# Patient Record
Sex: MALE | Race: WHITE | Employment: FULL TIME | URBAN - METROPOLITAN AREA
[De-identification: names, ages, dates, MRNs, and addresses within clinical notes are randomized per-mention and may not be internally consistent; named-entity substitution may affect disease eponyms.]

---

## 2020-07-05 ENCOUNTER — HOSPITAL ENCOUNTER (EMERGENCY)
Facility: CLINIC | Age: 60
Discharge: HOME | End: 2020-07-05
Attending: EMERGENCY MEDICINE
Payer: COMMERCIAL

## 2020-07-05 VITALS
OXYGEN SATURATION: 94 % | SYSTOLIC BLOOD PRESSURE: 134 MMHG | WEIGHT: 225 LBS | TEMPERATURE: 98.3 F | BODY MASS INDEX: 32.21 KG/M2 | DIASTOLIC BLOOD PRESSURE: 86 MMHG | HEART RATE: 93 BPM | HEIGHT: 70 IN | RESPIRATION RATE: 16 BRPM

## 2020-07-05 PROCEDURE — 2580000003 HC RX 258: Performed by: EMERGENCY MEDICINE

## 2020-07-05 PROCEDURE — 96375 TX/PRO/DX INJ NEW DRUG ADDON: CPT

## 2020-07-05 PROCEDURE — 96374 THER/PROPH/DIAG INJ IV PUSH: CPT

## 2020-07-05 PROCEDURE — 99282 EMERGENCY DEPT VISIT SF MDM: CPT

## 2020-07-05 PROCEDURE — 2500000003 HC RX 250 WO HCPCS: Performed by: EMERGENCY MEDICINE

## 2020-07-05 PROCEDURE — 6360000002 HC RX W HCPCS: Performed by: EMERGENCY MEDICINE

## 2020-07-05 RX ORDER — PREDNISONE 50 MG/1
50 TABLET ORAL DAILY
Qty: 4 TABLET | Refills: 0 | Status: SHIPPED | OUTPATIENT
Start: 2020-07-05 | End: 2020-07-09

## 2020-07-05 RX ORDER — SODIUM CHLORIDE 9 MG/ML
INJECTION, SOLUTION INTRAVENOUS CONTINUOUS
Status: DISCONTINUED | OUTPATIENT
Start: 2020-07-05 | End: 2020-07-05 | Stop reason: HOSPADM

## 2020-07-05 RX ORDER — DIPHENHYDRAMINE HYDROCHLORIDE 50 MG/ML
25 INJECTION INTRAMUSCULAR; INTRAVENOUS ONCE
Status: COMPLETED | OUTPATIENT
Start: 2020-07-05 | End: 2020-07-05

## 2020-07-05 RX ORDER — EPINEPHRINE 0.3 MG/.3ML
0.3 INJECTION SUBCUTANEOUS ONCE
Qty: 0.3 ML | Refills: 0 | Status: SHIPPED | OUTPATIENT
Start: 2020-07-05 | End: 2020-07-05

## 2020-07-05 RX ORDER — FAMOTIDINE 20 MG/1
20 TABLET, FILM COATED ORAL 2 TIMES DAILY
Qty: 10 TABLET | Refills: 0 | Status: SHIPPED | OUTPATIENT
Start: 2020-07-05

## 2020-07-05 RX ADMIN — DIPHENHYDRAMINE HYDROCHLORIDE 25 MG: 50 INJECTION, SOLUTION INTRAMUSCULAR; INTRAVENOUS at 13:20

## 2020-07-05 RX ADMIN — SODIUM CHLORIDE: 9 INJECTION, SOLUTION INTRAVENOUS at 13:20

## 2020-07-05 RX ADMIN — FAMOTIDINE 20 MG: 10 INJECTION, SOLUTION INTRAVENOUS at 13:30

## 2020-07-05 NOTE — ED PROVIDER NOTES
[facility]  [facility]  Phone: [address]      Pt Name: Maria Luisa Carrion  MRN: [id number]  [place] 1960  Date of evaluation: 7/5/2020    CHIEF COMPLAINT       Chief Complaint   Patient presents with    Allergic Reaction    Respiratory Distress       HISTORY OF PRESENT ILLNESS    Maria Luisa Carrion is a 63 y.o. male who presents to the emergency department by ambulance for anaphylactic reaction to bee sting. Got stung on his left hand and his right finger. Describe difficulty breathing called an ambulance. He was given subcutaneous epinephrine as well as IV Benadryl and Solu-Medrol. His breathing has improved denies difficulty swallowing or chest pain. Denies any other complaints. No history of reactions in the past.    REVIEW OF SYSTEMS       Constitutional: No fevers or chills   HEENT: No sore throat, rhinorrhea, or earache   Eyes: No blurry vision or double vision no drainage   Cardiovascular: No chest pain or tachycardia   Respiratory: No wheezing positive shortness of breath no cough  Gastrointestinal: No nausea, vomiting, diarrhea, constipation, or abdominal pain   : No hematuria or dysuria   Musculoskeletal: No swelling or pain   Skin: No rash   Neurological: No focal neurologic complaints, paresthesias, weakness, or headache     PAST MEDICAL HISTORY    has a past medical history of Hyperlipidemia and Hypertension. SURGICAL HISTORY      has no past surgical history on file. CURRENT MEDICATIONS       Previous Medications    No medications on file       ALLERGIES     has No Known Allergies. FAMILY HISTORY     has no family status information on file. family history is not on file. SOCIAL HISTORY      reports that he has quit smoking. He has never used smokeless tobacco. He reports current alcohol use. He reports that he does not use drugs.     PHYSICAL EXAM       ED Triage Vitals     98.3 °F (36.8 °C) 125 20 176/90 97 % Constitutional: Alert, oriented x3, nontoxic, answering questions appropriately, acting properly for age, in no acute distress   HEENT: Extraocular muscles intact, mucus membranes moist, no angioedema no uvular edema no posterior pharyngeal erythema or exudates, Pupils equal, round, reactive to light,   Neck: Trachea midline no stridor  Cardiovascular: Regular rhythm and tachycardia no S3, S4, or murmurs   Respiratory: Clear to auscultation bilaterally no wheezes, rhonchi, rales, no respiratory distress no tachypnea no retractions no hypoxia  Gastrointestinal: Soft, nontender, nondistended, positive bowel sounds. No rebound, rigidity, or guarding. Musculoskeletal: No extremity pain or swelling bee sting left dorsal hand and right third finger with localized reaction  Neurologic: Moving all 4 extremities without difficulty there are no gross focal neurologic deficits   Skin: Warm and dry diffuse urticaria        DIFFERENTIAL DIAGNOSIS/ MDM:     Acute anaphylactic reaction to bee sting. Will give more Benadryl IV and continue to monitor. IV fluids and Pepcid IV. DIAGNOSTIC RESULTS     EKG: All EKG's are interpreted by the Emergency Department Physician who either signs or Co-signs this chart in the absence of a cardiologist.        Not indicated unless otherwise documented above    LABS:  No results found for this visit on 07/05/20.     Not indicated unless otherwise documented above    RADIOLOGY:   I reviewed the radiologist interpretations:    No orders to display       Not indicated unless otherwise documented above    EMERGENCY DEPARTMENT COURSE:     The patient was given the following medications:  Orders Placed This Encounter   Medications    0.9 % sodium chloride infusion    diphenhydrAMINE (BENADRYL) injection 25 mg    famotidine (PEPCID) injection 20 mg    predniSONE (DELTASONE) 50 MG tablet     Sig: Take 1 tablet by mouth daily for 4 days     Dispense:  4 tablet     Refill:  0    allergic reaction    FAMOTIDINE (PEPCID) 20 MG TABLET    Take 1 tablet by mouth 2 times daily    PREDNISONE (DELTASONE) 50 MG TABLET    Take 1 tablet by mouth daily for 4 days       (Please note that portions of thisnote were completed with a voice recognition program.  Efforts were made to edit the dictations but occasionally words are mis-transcribed.)    Hayde Wise,,   Attending Emergency Physician        Hayde Wise,   07/05/20 4907

## 2020-07-05 NOTE — ED NOTES
Pt presents to the ED via squad for a c/c of respiratory distress and allergic reaction. Pt states that he was outside digging in his garden when he got stung by some bees and he started to feel dizzy and started to feel funny. Pt states that his mouth felt dry and his tongue felt tingly and like his throat was closing so his wife called 911 for respiratory distress. . Squad gave patient 0.5mg EPI SQ, 25mg benadryl IV, 125mg Solumedrol IV on scene with some relief. Pt has red patchy splotches to his legs and he states his eyes are red and swollen. Pt denies chest pain, SOB, nausea, vomiting, diarrhea, fevers, or chills. Pt resting in bed in NAD, skin warm and dry, respirations even and unlabored and call light within reach.      Marvin Briceno RN  07/05/20 [id number]

## 2020-07-05 NOTE — ED NOTES
Keyla Murphy,  at bedside for evaluation and to update patient on plan of care.       Bandar Beasley RN  07/05/20 [id number]

## 2020-10-27 ENCOUNTER — HOSPITAL ENCOUNTER (EMERGENCY)
Facility: CLINIC | Age: 60
Discharge: HOME | End: 2020-10-27
Attending: EMERGENCY MEDICINE
Payer: COMMERCIAL

## 2020-10-27 ENCOUNTER — APPOINTMENT (OUTPATIENT)
Dept: GENERAL RADIOLOGY | Facility: CLINIC | Age: 60
End: 2020-10-27
Payer: COMMERCIAL

## 2020-10-27 VITALS
TEMPERATURE: 98 F | DIASTOLIC BLOOD PRESSURE: 96 MMHG | HEART RATE: 78 BPM | SYSTOLIC BLOOD PRESSURE: 166 MMHG | RESPIRATION RATE: 18 BRPM | OXYGEN SATURATION: 98 %

## 2020-10-27 PROCEDURE — 73610 X-RAY EXAM OF ANKLE: CPT

## 2020-10-27 PROCEDURE — 99283 EMERGENCY DEPT VISIT LOW MDM: CPT

## 2020-10-27 PROCEDURE — 6370000000 HC RX 637 (ALT 250 FOR IP): Performed by: EMERGENCY MEDICINE

## 2020-10-27 RX ORDER — COLCHICINE 0.6 MG/1
0.6 TABLET ORAL DAILY
COMMUNITY

## 2020-10-27 RX ORDER — IBUPROFEN 600 MG/1
600 TABLET ORAL EVERY 6 HOURS PRN
Qty: 20 TABLET | Refills: 0 | Status: SHIPPED | OUTPATIENT
Start: 2020-10-27

## 2020-10-27 RX ORDER — ROSUVASTATIN CALCIUM 10 MG/1
10 TABLET, COATED ORAL DAILY
Qty: 30 TABLET | Refills: 0 | Status: SHIPPED | OUTPATIENT
Start: 2020-10-27

## 2020-10-27 RX ORDER — ROSUVASTATIN CALCIUM 10 MG/1
10 TABLET, COATED ORAL DAILY
COMMUNITY
End: 2020-10-27 | Stop reason: SDUPTHER

## 2020-10-27 RX ORDER — IBUPROFEN 800 MG/1
800 TABLET ORAL ONCE
Status: COMPLETED | OUTPATIENT
Start: 2020-10-27 | End: 2020-10-27

## 2020-10-27 RX ORDER — VALSARTAN AND HYDROCHLOROTHIAZIDE 80; 12.5 MG/1; MG/1
1 TABLET, FILM COATED ORAL DAILY
Qty: 30 TABLET | Refills: 0 | Status: SHIPPED | OUTPATIENT
Start: 2020-10-27

## 2020-10-27 RX ORDER — ALLOPURINOL 100 MG/1
100 TABLET ORAL DAILY
COMMUNITY

## 2020-10-27 RX ADMIN — IBUPROFEN 800 MG: 800 TABLET, FILM COATED ORAL at 07:25

## 2020-10-27 ASSESSMENT — PAIN SCALES - GENERAL
PAINLEVEL_OUTOF10: 7
PAINLEVEL_OUTOF10: 7

## 2020-10-27 ASSESSMENT — PAIN DESCRIPTION - LOCATION: LOCATION: ANKLE

## 2020-10-27 ASSESSMENT — PAIN DESCRIPTION - PAIN TYPE: TYPE: ACUTE PAIN

## 2020-10-27 ASSESSMENT — PAIN DESCRIPTION - ORIENTATION: ORIENTATION: RIGHT

## 2020-10-27 NOTE — ED PROVIDER NOTES
[facility]  [facility]  Phone: [phone]        Pt Name: Marylen Lowes  MRN: [id number]  [place] 1960  Date of evaluation: 10/27/20      CHIEF COMPLAINT     Chief Complaint   Patient presents with    Ankle Pain     right ankle trip and fall on saturday          HISTORY OF PRESENT ILLNESS  (Location/Symptom, Timing/Onset, Context/Setting, Quality, Duration, Modifying Factors, Severity.)    Marylen Lowes is a 63 y.o. male who presents with right ankle pain. Patient states he sustained a fall in his garage on Saturday. He states he really did not have pain at that time, but started to develop pain on Sunday morning. Today, he attempted to go to work and was unable to walk without pain. He denies sustaining any other injury. Did not strike his head. No loss of conscious. No neck, back, or abdominal pain. No pain in his knee or hip. No fever or chills. Patient states that he has hypertension and hyperlipidemia, and has been noncompliant with his medication since his doctor retired. He is wondering if I could refill some of his medicines. He also has a history of gout, and has not been taking any medications for that.       REVIEW OF SYSTEMS    (2-9 systems for level 4, 10 or more for level 5)     Constitutional: no fever, chills, fatigue  HENT: No headache, nasal congestion, sore throat, hearing changes, ear pain or discharge  Eyes: no visual changes or photophobia  Respiratory: no cough, shortness of breath, or wheezing  Cardiovascular: no chest pain, palpitations, or leg swelling  Abdominal: no abdominal pain, nausea, vomiting, diarrhea, or constipation  Genitourinary: no dysuria, frequency, or urgency  Musculoskeletal: no arthralgias, myalgias, neck or back pain, +right ankle pain  Skin: no rash or wound  Neurological: no numbness, tingling or weakness  Hematologic:  no history of easy bleeding or bruising            PAST MEDICAL HISTORY    has a past medical history of Hyperlipidemia and Hypertension. SURGICAL HISTORY      has no past surgical history on file. CURRENTMEDICATIONS       Previous Medications    ALLOPURINOL (ZYLOPRIM) 100 MG TABLET    Take 100 mg by mouth daily    COLCHICINE (COLCRYS) 0.6 MG TABLET    Take 0.6 mg by mouth daily    EPINEPHRINE (EPIPEN 2-JABIER) 0.3 MG/0.3ML SOAJ INJECTION    Inject 0.3 mLs into the skin once for 1 dose Use as directed for allergic reaction    FAMOTIDINE (PEPCID) 20 MG TABLET    Take 1 tablet by mouth 2 times daily       ALLERGIES     has No Known Allergies. FAMILY HISTORY     has no family status information on file. family history is not on file. SOCIAL HISTORY      reports that he has quit smoking. He has never used smokeless tobacco. He reports current alcohol use. He reports that he does not use drugs. PHYSICAL EXAM    (up to 7 for level 4, 8 or more for level 5)   INITIAL VITALS:  temperature is 98 °F (36.7 °C). His blood pressure is 166/96 (abnormal) and his pulse is 78. His respiration is 18 and oxygen saturation is 98%. Physical Exam  Vitals signs and nursing note reviewed. Constitutional:       General: He is not in acute distress. Appearance: Normal appearance. He is not ill-appearing, toxic-appearing or diaphoretic. HENT:      Head: Normocephalic and atraumatic. Eyes:      Extraocular Movements: Extraocular movements intact. Pupils: Pupils are equal, round, and reactive to light. Neck:      Musculoskeletal: Normal range of motion. No muscular tenderness. Abdominal:      General: Abdomen is flat. Palpations: Abdomen is soft. Tenderness: There is no abdominal tenderness. Musculoskeletal: Normal range of motion. General: Tenderness present. Comments: No midline tenderness in the thoracic or lumbar spine. No step-offs. Evaluation of the right ankle reveals no bony tenderness over the lateral or medial malleoli.   No tenderness over the calcaneus. No tenderness over the fifth metatarsal.  No proximal fibula tenderness. Patient has discomfort with palpation of the Achilles tendon. His foot plantar flexes with Felix's test.  There is no erythema, swelling, or warmth to the joint. Dorsalis pedis and posterior tibial artery pulses are palpable and symmetrical bilaterally. No tenderness over the foot itself. Skin:     General: Skin is warm and dry. Capillary Refill: Capillary refill takes less than 2 seconds. Neurological:      Mental Status: He is alert and oriented to person, place, and time. Mental status is at baseline. Psychiatric:         Mood and Affect: Mood normal.         Behavior: Behavior normal.         Thought Content: Thought content normal.         DIFFERENTIAL DIAGNOSIS/ MDM:     69-year-old male here with pain over his Achilles tendon after fall in his garage on Saturday morning. No significant bony tenderness appreciated on exam.  He does have tenderness over the Achilles tendon, with an intact Felix's test.  My impression is that this is Achilles tendinitis. I will order an ankle x-ray just to make sure that there are no other abnormalities such as an avulsion. I will treat the patient with anti-inflammatories. I discussed with him that I would refill his antihypertensive and his statin. We will provide him with a list of primary care doctors in the area. Patient assures me he will call one of those physicians, and schedule an appointment.     DIAGNOSTIC RESULTS     EKG: All EKG's are interpreted by the Emergency Department Physician who either signs or Co-signs this chart in the absenceof a cardiologist.    none    RADIOLOGY:  Non-plain film images such as CT, Ultrasound and MRI are read by the radiologist. Plain radiographic images are visualized and the radiologist interpretations are reviewed as follows:         Interpretation per the Radiologist below, if available at the time of this note:    Xr Ankle Right (min 3 Views)    Result Date: 10/27/2020  EXAMINATION: THREE XRAY VIEWS OF THE RIGHT ANKLE 10/27/2020 7:30 am COMPARISON: None. HISTORY: ORDERING SYSTEM PROVIDED HISTORY: right ankle pain TECHNOLOGIST PROVIDED HISTORY: right ankle pain Reason for Exam: Pt c/o right ankle pain s/p trip and fall 4 days ago Acuity: Acute Type of Exam: Initial FINDINGS: The bone mineralization is within normal limits. The ankle mortise is stable. No acute fractures or dislocations are seen. There are old avulsion injuries involving the lateral  and medial malleoli. There is a calcaneal spur at the insertion site of the plantar fascia. There is no soft tissue swelling     1. No acute abnormality involving the right ankle. LABS:  No results found for this visit on 10/27/20.    none    EMERGENCY DEPARTMENT COURSE:   Vitals:    Vitals:    10/27/20 0714 10/27/20 0716 10/27/20 0718   BP:   (!) 166/96   Pulse: 78     Resp: 18     Temp:  98 °F (36.7 °C)    SpO2: 98%       -------------------------  BP: (!) 166/96, Temp: 98 °F (36.7 °C), Pulse: 78, Resp: 18      RE-EVALUATION:  Patient resting comfortably. Updated on results. CONSULTS:  None     PROCEDURES:  None    FINAL IMPRESSION      1.  Achilles tendinitis of right lower extremity          DISPOSITION/PLAN   DISPOSITION Decision To Discharge 10/27/2020 08:11:24 AM      CONDITION ON DISPOSITION:   Stable    PATIENT REFERRED TO:    [id number] for follow up appointment with a primary care doctor in 2-3 days        Ileana Sullivan, [address]  [place]  [phone]    Schedule an appointment as soon as possible for a visit in 3 days        DISCHARGE MEDICATIONS:  New Prescriptions    IBUPROFEN (IBU) 600 MG TABLET    Take 1 tablet by mouth every 6 hours as needed for Pain    VALSARTAN-HYDROCHLOROTHIAZIDE (DIOVAN-HCT) 80-12.5 MG PER TABLET    Take 1 tablet by mouth daily       (Please note that portions of this note were completed with a voicerecognition program.  Efforts were made to edit the dictations but occasionally words are mis-transcribed.)    Sheldon Oseguera MD  Attending Emergency Medicine Physician       Sheldon Oseguera MD  10/27/20 1875

## 2020-12-08 ENCOUNTER — HOSPITAL ENCOUNTER (OUTPATIENT)
Dept: MRI IMAGING | Facility: CLINIC | Age: 60
Discharge: HOME | End: 2020-12-10
Payer: COMMERCIAL

## 2020-12-08 PROCEDURE — 73721 MRI JNT OF LWR EXTRE W/O DYE: CPT

## 2024-03-18 ENCOUNTER — HOSPITAL ENCOUNTER (EMERGENCY)
Age: 64
Discharge: HOME | End: 2024-03-18
Attending: EMERGENCY MEDICINE
Payer: COMMERCIAL

## 2024-03-18 ENCOUNTER — APPOINTMENT (OUTPATIENT)
Dept: GENERAL RADIOLOGY | Age: 64
End: 2024-03-18
Payer: COMMERCIAL

## 2024-03-18 VITALS
HEIGHT: 70 IN | WEIGHT: 225 LBS | OXYGEN SATURATION: 95 % | DIASTOLIC BLOOD PRESSURE: 98 MMHG | BODY MASS INDEX: 32.21 KG/M2 | TEMPERATURE: 97.9 F | RESPIRATION RATE: 18 BRPM | HEART RATE: 78 BPM | SYSTOLIC BLOOD PRESSURE: 154 MMHG

## 2024-03-18 DIAGNOSIS — F10.920 ACUTE ALCOHOLIC INTOXICATION WITHOUT COMPLICATION (HCC): Primary | ICD-10-CM

## 2024-03-18 DIAGNOSIS — M25.561 ACUTE PAIN OF RIGHT KNEE: ICD-10-CM

## 2024-03-18 PROCEDURE — 73562 X-RAY EXAM OF KNEE 3: CPT

## 2024-03-18 PROCEDURE — 99283 EMERGENCY DEPT VISIT LOW MDM: CPT

## 2024-03-18 ASSESSMENT — ENCOUNTER SYMPTOMS
EYE REDNESS: 0
EYE DISCHARGE: 0
SHORTNESS OF BREATH: 0
COLOR CHANGE: 0
VOMITING: 0
RHINORRHEA: 0
SORE THROAT: 0
NAUSEA: 0
DIARRHEA: 0

## 2024-03-18 NOTE — ED NOTES
Pt to ed via ems following fall at work. Ems states etoh on board. Ems states pt works at [employer]. Pt c/o right knee injury from fall. Pt denies hitting head or LOC. Pt a/o x4. Respirations equal and non labored. Pt speaking in full and complete sentences. Call light in reach. Bed locked and in lowest position.

## 2024-03-18 NOTE — ED NOTES
Pt requesting to leave. Pt states he will follow up with his knee doctor for knee pain. Dr Aguilar notified. Pt ok to leave per dr bustos.

## 2024-03-18 NOTE — ED NOTES
Chief Complaint   Patient presents with   • Urticaria     Rash, possible reaction to medications     Subjective   Willi Rayo is a 56 y.o. male.     History of Present Illness     Patient presents with a possible reaction to his medications.  States he has lost over 100 pounds and he was having dizziness and lower blood pressures and his metoprolol.  He stopped taking his and states that his blood pressure has been under 130s over 80s.  Last time he checked it it was 114 over 70s.  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  He also developed a hive type rash and had to go to the ER for this.  They gave him Decadron, Pepcid, and recommended him take Benadryl.  Patient did not use any new lotions, detergents, soaps.  Denies any tick or bug bites.  States that he did not have a fever, aches, chills, joint pains with the rash.  The rash was raised, itchy.  Patient has photos on his phone of the rash and showed them to me it appeared to be hives.  He is no longer having the rash, it has cleared up.  Patient states it started to clear up after he reviewed his medications and found out that allopurinol is weight-based and rash is a side effect of the medication.  He started taking half of the dose of his allopurinol, and the rash was still there but getting better.  That he completely stopped taking it 3 days ago and the rash completely went away.  He does not want to restart this medication.  He is asking for medication to use in case he has a gout flare.  Patient states he has only had 1 flare of gout in his entire life and that was around 2 years ago to his great toe.  He denies eating a high purine diet or drinking alcohol.  Patient would also like to start a medication for his erectile dysfunction.  Patient states he is aware not to take Nitrostat, or other cardiac medications along with this because it can drop his blood pressure too low.     The following portions of the patient's history were  No Problem.    Previous Messages      ----- Message -----   From: Chula Ma RN   Sent: 10/5/2018   3:23 PM   To: Simon Sheldon MD      The staff contacted me and the patient.   Thank you for your help in caring for this patient.     ----- Message -----   From: Simon Sheldon MD   Sent: 10/5/2018  11:23 AM   To: Chula Ma RN     Ok   Will schedule for him.   ----- Message -----   From: Chula Ma RN   Sent: 10/4/2018   4:41 PM   To: Simon Sheldon MD, [facility] Staff     My name is Chula the Stem Cell Transplant Coordinator for Mr. Mead.  Dr. Pelaez informed me that you would like to do a repeat stress test.  I did speak with the patient and he would like that scheduled in [place].       Will your staff set that up for the patient there?  We are planning on starting his mobilization with [facility] on Friday 10/12.     Please advise.     Thank you   Chula Ma RN, BMTCN   Stem Cell Transplant Coordinator   [phone]                  Xray at bedside    "reviewed and updated as appropriate: allergies, current medications, past family history, past medical history, past social history, past surgical history and problem list.    Review of Systems   Constitutional: Negative.  Negative for fatigue and fever.   HENT: Negative.  Negative for congestion, rhinorrhea and sore throat.    Eyes: Negative.  Negative for pain.   Respiratory: Negative.  Negative for cough and shortness of breath.    Cardiovascular: Negative.    Gastrointestinal: Negative.  Negative for diarrhea and vomiting.   Genitourinary: Negative.    Musculoskeletal: Negative.    Skin: Positive for rash (better now).   Neurological: Negative.    All other systems reviewed and are negative.      Objective   /80   Pulse 84   Temp 97.1 °F (36.2 °C) (Temporal)   Resp 16   Ht 185.4 cm (73\")   Wt (!) 140 kg (308 lb)   SpO2 98%   BMI 40.64 kg/m²   Body mass index is 40.64 kg/m².  Physical Exam  Vitals and nursing note reviewed.   Constitutional:       Appearance: Normal appearance. He is obese.   HENT:      Head: Normocephalic and atraumatic.   Eyes:      Extraocular Movements: Extraocular movements intact.   Cardiovascular:      Rate and Rhythm: Normal rate and regular rhythm.   Pulmonary:      Effort: Pulmonary effort is normal.      Breath sounds: Normal breath sounds.   Musculoskeletal:      Cervical back: Normal range of motion and neck supple.   Lymphadenopathy:      Cervical: No cervical adenopathy.   Skin:     General: Skin is warm and dry.      Coloration: Skin is not jaundiced or pale.      Findings: No erythema or rash.   Neurological:      General: No focal deficit present.      Mental Status: He is alert and oriented to person, place, and time.   Psychiatric:         Mood and Affect: Mood normal.         Behavior: Behavior normal.         Thought Content: Thought content normal.         Judgment: Judgment normal.         Assessment & Plan   Willi Rayo is here today and the following " problems have been addressed:      Diagnoses and all orders for this visit:    1. Adjustment reaction to medical therapy (Primary)  -     Patient has lost over 100 pounds within the past year.  States that his blood pressure medication was causing him to have hypotension since losing weight.  He stopped the metoprolol 50 mg and has had no issues since with normal at home readings.  Blood pressure is 134/80 today.  Patient stopped the allopurinol due improvement of rash after stopping the medication. Denies wanting to start the medication back and would like to discuss as needed medications for gout flares and he will watch the purine in his diet and avoid alcohol.     2. Erectile dysfunction, unspecified erectile dysfunction type  -     tadalafil (Cialis) 10 MG tablet; Take 1 tablet by mouth Daily As Needed for Erectile Dysfunction.  Dispense: 15 tablet; Refill: 5  - Do not take this medication with Nitrostat.  Will lower your blood pressure and cause hypotension.  Patient verbalizes understanding.    3. Chronic gout without tophus, unspecified cause, unspecified site  -     colchicine 0.6 MG tablet; Take 2 tablets orally, then 1 tablet 1 hour later for the onset of gout flare  Dispense: 3 tablet; Refill: 5  - Medication sent for patient to take as needed for gout flare.  Patient has only had 1 gout flare in his life and his great toe.  Has a history of high uric acid levels in his bloodstream.  Instructed to follow a low purine diet and avoid alcohol.     4. Benign essential hypertension  -     Elevated. Blood pressure goal is under 130/80.  Recommend DASH diet, moderate-intensity exercises 4-5 times/week, and home blood pressure monitoring.  Let us know if blood pressure is increasing above 140 over 90s.  To ER if BP is ever 180/110, chest pain, SOA, any distress.      Follow Up   Return for F/u with Dr. Verduzco, Annual.  Patient was given instructions and counseling regarding his condition or for health maintenance  advice. Please see specific information pulled into the AVS if appropriate.     Amrita CARBALLO  [facility] [place]

## 2024-03-18 NOTE — ED PROVIDER NOTES
EMERGENCY DEPARTMENT ENCOUNTER    Pt Name: Mauricio Cortez  MRN: [id number]  Birthdate 1960  Date of evaluation: 3/18/24  CHIEF COMPLAINT       Chief Complaint   Patient presents with    Alcohol Intoxication    Fall    Knee Injury     Right        HISTORY OF PRESENT ILLNESS   This is a 63-year-old male that presents the emergency department with complaints of right-sided knee pain and mild alcohol intoxication.  Patient states that he was not drinking at all this morning, he was drinking yesterday and when he woke up today he went to work, he fell out of his truck at work and was noted by EMS to be slightly intoxicated and smell of alcohol so they were concerned about him driving home and they sent him here for further evaluation.  There was no sign of trauma to his head or neck, patient denies any pain except for some mild pain to his right knee which he states had a knee replacement.           REVIEW OF SYSTEMS     Review of Systems   Constitutional:  Negative for chills and fever.   HENT:  Negative for rhinorrhea and sore throat.    Eyes:  Negative for discharge, redness and visual disturbance.   Respiratory:  Negative for cough and shortness of breath.    Cardiovascular:  Negative for chest pain, palpitations and leg swelling.   Gastrointestinal:  Negative for diarrhea, nausea and vomiting.   Musculoskeletal:  Negative for arthralgias, myalgias and neck pain.        Right knee pain   Skin:  Negative for color change and rash.   Neurological:  Negative for seizures, weakness and headaches.   Psychiatric/Behavioral:  Negative for hallucinations, self-injury and suicidal ideas.      PASTMEDICAL HISTORY     Past Medical History:   Diagnosis Date    Hyperlipidemia     Hypertension      Past Problem List  There is no problem list on file for this patient.    SURGICAL HISTORY     History reviewed. No pertinent surgical history.  CURRENT MEDICATIONS       Previous Medications    ALLOPURINOL (ZYLOPRIM) 100 MG TABLET

## 2024-05-01 ENCOUNTER — HOSPITAL ENCOUNTER (OUTPATIENT)
Age: 64
Setting detail: SPECIMEN
Discharge: HOME | End: 2024-05-01

## 2024-05-06 ENCOUNTER — HOSPITAL ENCOUNTER (OUTPATIENT)
Age: 64
Setting detail: SPECIMEN
Discharge: HOME | End: 2024-05-06

## 2024-05-06 LAB
ANION GAP SERPL CALCULATED.3IONS-SCNC: 10 MMOL/L (ref 9–17)
BUN SERPL-MCNC: 8 MG/DL (ref 8–23)
BUN/CREAT SERPL: 11 (ref 9–20)
CALCIUM SERPL-MCNC: 9.6 MG/DL (ref 8.6–10.4)
CHLORIDE SERPL-SCNC: 100 MMOL/L (ref 98–107)
CO2 SERPL-SCNC: 28 MMOL/L (ref 20–31)
CREAT SERPL-MCNC: 0.7 MG/DL (ref 0.7–1.2)
GFR, ESTIMATED: >90 ML/MIN/1.73M2
GLUCOSE SERPL-MCNC: 94 MG/DL (ref 70–99)
POTASSIUM SERPL-SCNC: 3.7 MMOL/L (ref 3.7–5.3)
SODIUM SERPL-SCNC: 138 MMOL/L (ref 135–144)

## 2024-05-06 PROCEDURE — 80048 BASIC METABOLIC PNL TOTAL CA: CPT

## 2024-05-06 PROCEDURE — 36415 COLL VENOUS BLD VENIPUNCTURE: CPT

## 2024-05-06 PROCEDURE — P9603 ONE-WAY ALLOW PRORATED MILES: HCPCS
